# Patient Record
Sex: FEMALE | Race: WHITE | NOT HISPANIC OR LATINO
[De-identification: names, ages, dates, MRNs, and addresses within clinical notes are randomized per-mention and may not be internally consistent; named-entity substitution may affect disease eponyms.]

---

## 2017-07-17 ENCOUNTER — CHARTING TRANS (OUTPATIENT)
Dept: OTHER | Age: 72
End: 2017-07-17

## 2017-07-17 ASSESSMENT — PAIN SCALES - GENERAL: PAINLEVEL_OUTOF10: 0

## 2017-11-09 ENCOUNTER — IMAGING SERVICES (OUTPATIENT)
Dept: OTHER | Age: 72
End: 2017-11-09

## 2017-11-09 ENCOUNTER — HOSPITAL (OUTPATIENT)
Dept: OTHER | Age: 72
End: 2017-11-09
Attending: OBSTETRICS & GYNECOLOGY

## 2017-11-17 ENCOUNTER — CHARTING TRANS (OUTPATIENT)
Dept: OTHER | Age: 72
End: 2017-11-17

## 2018-11-03 VITALS
WEIGHT: 114 LBS | TEMPERATURE: 98.6 F | HEART RATE: 106 BPM | RESPIRATION RATE: 18 BRPM | DIASTOLIC BLOOD PRESSURE: 80 MMHG | SYSTOLIC BLOOD PRESSURE: 138 MMHG | BODY MASS INDEX: 21.52 KG/M2 | HEIGHT: 61 IN

## 2021-03-14 ENCOUNTER — APPOINTMENT (OUTPATIENT)
Dept: GENERAL RADIOLOGY | Age: 76
DRG: 562 | End: 2021-03-14
Attending: PHYSICIAN ASSISTANT
Payer: MEDICARE

## 2021-03-14 ENCOUNTER — HOSPITAL ENCOUNTER (INPATIENT)
Facility: HOSPITAL | Age: 76
LOS: 3 days | Discharge: HOSPICE | DRG: 562 | End: 2021-03-19
Attending: HOSPITALIST | Admitting: HOSPITALIST
Payer: MEDICARE

## 2021-03-14 ENCOUNTER — APPOINTMENT (OUTPATIENT)
Dept: CT IMAGING | Age: 76
DRG: 562 | End: 2021-03-14
Attending: PHYSICIAN ASSISTANT
Payer: MEDICARE

## 2021-03-14 DIAGNOSIS — J44.1 COPD EXACERBATION (HCC): ICD-10-CM

## 2021-03-14 DIAGNOSIS — S52.509A DISPLACED FRACTURE OF DISTAL END OF RADIUS: ICD-10-CM

## 2021-03-14 DIAGNOSIS — S52.614A CLOSED NONDISPLACED FRACTURE OF STYLOID PROCESS OF RIGHT ULNA, INITIAL ENCOUNTER: Primary | ICD-10-CM

## 2021-03-14 DIAGNOSIS — R42 VERTIGO: ICD-10-CM

## 2021-03-14 PROBLEM — R73.9 HYPERGLYCEMIA: Status: ACTIVE | Noted: 2021-03-14

## 2021-03-14 PROBLEM — D64.9 ANEMIA: Status: ACTIVE | Noted: 2021-03-14

## 2021-03-14 PROBLEM — E87.3 METABOLIC ALKALOSIS: Status: ACTIVE | Noted: 2021-03-14

## 2021-03-14 LAB
ALBUMIN SERPL-MCNC: 3.1 G/DL (ref 3.4–5)
ALBUMIN/GLOB SERPL: 0.8 {RATIO} (ref 1–2)
ALP LIVER SERPL-CCNC: 52 U/L
ALT SERPL-CCNC: 26 U/L
ANION GAP SERPL CALC-SCNC: 1 MMOL/L (ref 0–18)
AST SERPL-CCNC: 37 U/L (ref 15–37)
BASOPHILS # BLD AUTO: 0.03 X10(3) UL (ref 0–0.2)
BASOPHILS NFR BLD AUTO: 0.4 %
BILIRUB SERPL-MCNC: 0.3 MG/DL (ref 0.1–2)
BILIRUB UR QL STRIP.AUTO: NEGATIVE
BUN BLD-MCNC: 13 MG/DL (ref 7–18)
BUN/CREAT SERPL: 19.1 (ref 10–20)
CALCIUM BLD-MCNC: 9.8 MG/DL (ref 8.5–10.1)
CHLORIDE SERPL-SCNC: 101 MMOL/L (ref 98–112)
CLARITY UR REFRACT.AUTO: CLEAR
CO2 SERPL-SCNC: 39 MMOL/L (ref 21–32)
COLOR UR AUTO: YELLOW
CREAT BLD-MCNC: 0.68 MG/DL
DEPRECATED RDW RBC AUTO: 46.5 FL (ref 35.1–46.3)
EOSINOPHIL # BLD AUTO: 0.07 X10(3) UL (ref 0–0.7)
EOSINOPHIL NFR BLD AUTO: 0.9 %
ERYTHROCYTE [DISTWIDTH] IN BLOOD BY AUTOMATED COUNT: 13.1 % (ref 11–15)
GLOBULIN PLAS-MCNC: 3.9 G/DL (ref 2.8–4.4)
GLUCOSE BLD-MCNC: 116 MG/DL (ref 70–99)
GLUCOSE UR STRIP.AUTO-MCNC: NEGATIVE MG/DL
HCT VFR BLD AUTO: 35.8 %
HGB BLD-MCNC: 10.3 G/DL
IMM GRANULOCYTES # BLD AUTO: 0.01 X10(3) UL (ref 0–1)
IMM GRANULOCYTES NFR BLD: 0.1 %
ISTAT BLOOD GAS BASE EXCESS: -2 MMOL/L (ref ?–30)
ISTAT BLOOD GAS HCO3: 23.2 MEQ/L (ref 22–26)
ISTAT BLOOD GAS O2 SATURATION: 69 % (ref 60–85)
ISTAT BLOOD GAS PCO2: 39.4 MMHG (ref 38–50)
ISTAT BLOOD GAS PH: 7.38 (ref 7.32–7.43)
ISTAT BLOOD GAS PO2: <70 MMHG (ref 35–40)
ISTAT BLOOD GAS TCO2: 24 MMOL/L (ref 22–32)
KETONES UR STRIP.AUTO-MCNC: NEGATIVE MG/DL
LEUKOCYTE ESTERASE UR QL STRIP.AUTO: NEGATIVE
LYMPHOCYTES # BLD AUTO: 1.66 X10(3) UL (ref 1–4)
LYMPHOCYTES NFR BLD AUTO: 21 %
M PROTEIN MFR SERPL ELPH: 7 G/DL (ref 6.4–8.2)
MCH RBC QN AUTO: 27.6 PG (ref 26–34)
MCHC RBC AUTO-ENTMCNC: 28.8 G/DL (ref 31–37)
MCV RBC AUTO: 96 FL
MONOCYTES # BLD AUTO: 0.73 X10(3) UL (ref 0.1–1)
MONOCYTES NFR BLD AUTO: 9.2 %
NEUTROPHILS # BLD AUTO: 5.42 X10 (3) UL (ref 1.5–7.7)
NEUTROPHILS # BLD AUTO: 5.42 X10(3) UL (ref 1.5–7.7)
NEUTROPHILS NFR BLD AUTO: 68.4 %
NITRITE UR QL STRIP.AUTO: NEGATIVE
NT-PROBNP SERPL-MCNC: 76 PG/ML (ref ?–125)
OSMOLALITY SERPL CALC.SUM OF ELEC: 293 MOSM/KG (ref 275–295)
PH UR STRIP.AUTO: 5.5 [PH] (ref 5–8)
PLATELET # BLD AUTO: 202 10(3)UL (ref 150–450)
POTASSIUM SERPL-SCNC: 5.1 MMOL/L (ref 3.5–5.1)
PROT UR STRIP.AUTO-MCNC: NEGATIVE MG/DL
RBC # BLD AUTO: 3.73 X10(6)UL
SARS-COV-2 RNA RESP QL NAA+PROBE: NOT DETECTED
SODIUM SERPL-SCNC: 141 MMOL/L (ref 136–145)
SP GR UR STRIP.AUTO: >=1.03 (ref 1–1.03)
TROPONIN I SERPL-MCNC: <0.045 NG/ML (ref ?–0.04)
UROBILINOGEN UR STRIP.AUTO-MCNC: 0.2 MG/DL
WBC # BLD AUTO: 7.9 X10(3) UL (ref 4–11)

## 2021-03-14 PROCEDURE — 71101 X-RAY EXAM UNILAT RIBS/CHEST: CPT | Performed by: PHYSICIAN ASSISTANT

## 2021-03-14 PROCEDURE — 70450 CT HEAD/BRAIN W/O DYE: CPT | Performed by: PHYSICIAN ASSISTANT

## 2021-03-14 PROCEDURE — 73110 X-RAY EXAM OF WRIST: CPT | Performed by: PHYSICIAN ASSISTANT

## 2021-03-14 PROCEDURE — 72072 X-RAY EXAM THORAC SPINE 3VWS: CPT | Performed by: PHYSICIAN ASSISTANT

## 2021-03-14 PROCEDURE — 99219 INITIAL OBSERVATION CARE,LEVL II: CPT | Performed by: HOSPITALIST

## 2021-03-14 PROCEDURE — 72110 X-RAY EXAM L-2 SPINE 4/>VWS: CPT | Performed by: PHYSICIAN ASSISTANT

## 2021-03-14 RX ORDER — CLONAZEPAM 0.5 MG/1
0.25 TABLET ORAL 2 TIMES DAILY PRN
Status: DISCONTINUED | OUTPATIENT
Start: 2021-03-14 | End: 2021-03-16

## 2021-03-14 RX ORDER — HYDROMORPHONE HYDROCHLORIDE 1 MG/ML
0.5 INJECTION, SOLUTION INTRAMUSCULAR; INTRAVENOUS; SUBCUTANEOUS EVERY 30 MIN PRN
Status: ACTIVE | OUTPATIENT
Start: 2021-03-14 | End: 2021-03-15

## 2021-03-14 RX ORDER — BISACODYL 10 MG
10 SUPPOSITORY, RECTAL RECTAL DAILY
Status: ON HOLD | COMMUNITY
End: 2021-03-18

## 2021-03-14 RX ORDER — ALBUTEROL SULFATE 90 UG/1
2 AEROSOL, METERED RESPIRATORY (INHALATION) EVERY 4 HOURS PRN
COMMUNITY

## 2021-03-14 RX ORDER — OXYCODONE HYDROCHLORIDE 5 MG/1
0.5 TABLET ORAL EVERY 6 HOURS PRN
Status: DISCONTINUED | OUTPATIENT
Start: 2021-03-14 | End: 2021-03-15

## 2021-03-14 RX ORDER — MECLIZINE HYDROCHLORIDE 25 MG/1
25 TABLET ORAL ONCE
Status: COMPLETED | OUTPATIENT
Start: 2021-03-14 | End: 2021-03-15

## 2021-03-14 RX ORDER — LORAZEPAM 0.5 MG/1
0.5 TABLET ORAL AS DIRECTED
Status: ON HOLD | COMMUNITY
End: 2021-03-18

## 2021-03-14 RX ORDER — OXYCODONE HYDROCHLORIDE 5 MG/1
5 TABLET ORAL EVERY 4 HOURS PRN
Status: ON HOLD | COMMUNITY
End: 2021-03-18

## 2021-03-14 RX ORDER — PAROXETINE 30 MG/1
30 TABLET, FILM COATED ORAL EVERY MORNING
COMMUNITY

## 2021-03-14 RX ORDER — ALBUTEROL SULFATE 90 UG/1
2 AEROSOL, METERED RESPIRATORY (INHALATION) EVERY 4 HOURS PRN
Status: DISCONTINUED | OUTPATIENT
Start: 2021-03-14 | End: 2021-03-19

## 2021-03-14 RX ORDER — LORAZEPAM 0.5 MG/1
0.5 TABLET ORAL NIGHTLY
Status: DISCONTINUED | OUTPATIENT
Start: 2021-03-14 | End: 2021-03-16

## 2021-03-14 RX ORDER — SENNA AND DOCUSATE SODIUM 50; 8.6 MG/1; MG/1
1 TABLET, FILM COATED ORAL DAILY
COMMUNITY

## 2021-03-14 RX ORDER — ONDANSETRON 2 MG/ML
4 INJECTION INTRAMUSCULAR; INTRAVENOUS EVERY 4 HOURS PRN
Status: ACTIVE | OUTPATIENT
Start: 2021-03-14 | End: 2021-03-15

## 2021-03-14 NOTE — ED PROVIDER NOTES
Patient Seen in: [facility] In [place]      History   Patient presents with:  Fall    Stated Complaint:     HPI/Subjective:   HPI    68-year-old female. Medical history of pulmonary hypertension with COPD, asthma, hypertension, IBS. °C) (Temporal)   Resp 22   Wt 45.8 kg   SpO2 100%         Physical Exam      Gen: Well appearing, well groomed, alert and aware x 3  Neck: Supple, full range of motion, no thyromegaly or lymphadenopathy. No cervical point tenderness.   Eye examination: EOM following orders were created for panel order CBC WITH DIFFERENTIAL WITH PLATELET.   Procedure                               Abnormality         Status                     ---------                               -----------         ------ There is a right mastoid effusion.    Dictated by (CST): Adele Padilla MD on 3/14/2021 at 5:31 PM     Finalized by (CST): Adele Padilla MD on 3/14/2021 at 5:33 PM              MDM          My supervising physician was involved in the management of this pat (primary encounter diagnosis)  Displaced fracture of distal end of radius  Vertigo  COPD exacerbation ([facility] [address]    Disposition:  There is no disposition on file for this visit. There is no disposition time on file for this visit.     Follow-up:  No follow-up p

## 2021-03-14 NOTE — ED INITIAL ASSESSMENT (HPI)
Pt to ed by ems with c/o room spinning after getting up and walking, fell to the ground, pain and injury to r wrist, pt admits to having the symptoms for about 2 days, pt also having some pain to lower back and rib area.  aaox3,

## 2021-03-15 PROBLEM — J44.9 CHRONIC OBSTRUCTIVE PULMONARY DISEASE (HCC): Status: ACTIVE | Noted: 2021-03-14

## 2021-03-15 LAB
ANION GAP SERPL CALC-SCNC: 2 MMOL/L (ref 0–18)
ATRIAL RATE: 90 BPM
BUN BLD-MCNC: 10 MG/DL (ref 7–18)
BUN/CREAT SERPL: 45.5 (ref 10–20)
CALCIUM BLD-MCNC: 10 MG/DL (ref 8.5–10.1)
CHLORIDE SERPL-SCNC: 104 MMOL/L (ref 98–112)
CO2 SERPL-SCNC: 36 MMOL/L (ref 21–32)
CREAT BLD-MCNC: 0.22 MG/DL
GLUCOSE BLD-MCNC: 104 MG/DL (ref 70–99)
OSMOLALITY SERPL CALC.SUM OF ELEC: 293 MOSM/KG (ref 275–295)
P AXIS: 96 DEGREES
P-R INTERVAL: 120 MS
POTASSIUM SERPL-SCNC: 4 MMOL/L (ref 3.5–5.1)
Q-T INTERVAL: 368 MS
QRS DURATION: 88 MS
QTC CALCULATION (BEZET): 450 MS
R AXIS: 86 DEGREES
SODIUM SERPL-SCNC: 142 MMOL/L (ref 136–145)
T AXIS: 77 DEGREES
VENTRICULAR RATE: 90 BPM

## 2021-03-15 PROCEDURE — 99225 SUBSEQUENT OBSERVATION CARE: CPT | Performed by: INTERNAL MEDICINE

## 2021-03-15 RX ORDER — OXYCODONE HYDROCHLORIDE 5 MG/1
5 TABLET ORAL EVERY 6 HOURS PRN
Status: DISCONTINUED | OUTPATIENT
Start: 2021-03-15 | End: 2021-03-15

## 2021-03-15 RX ORDER — SENNA AND DOCUSATE SODIUM 50; 8.6 MG/1; MG/1
1 TABLET, FILM COATED ORAL DAILY
Status: DISCONTINUED | OUTPATIENT
Start: 2021-03-15 | End: 2021-03-19

## 2021-03-15 RX ORDER — OXYCODONE HYDROCHLORIDE 5 MG/1
5 TABLET ORAL EVERY 4 HOURS PRN
Status: DISCONTINUED | OUTPATIENT
Start: 2021-03-15 | End: 2021-03-19

## 2021-03-15 RX ORDER — ACETAMINOPHEN 325 MG/1
650 TABLET ORAL EVERY 6 HOURS PRN
Status: DISCONTINUED | OUTPATIENT
Start: 2021-03-15 | End: 2021-03-19

## 2021-03-15 NOTE — DIETARY NOTE
[place]     Admitting diagnosis:  Vertigo [R42]  COPD exacerbation ([facility] [address] [J44.1]  Closed nondisplaced fracture of styloid process of right ulna, initial encounter [B59.239L]  Displaced fracture of distal end o

## 2021-03-15 NOTE — PHYSICAL THERAPY NOTE
PHYSICAL THERAPY EVALUATION - INPATIENT     Room Number: 361/361-A  Evaluation Date: 3/15/2021  Type of Evaluation: Initial  Physician Order: PT Eval and Treat    Presenting Problem: closed nondisplaced fracture of styloid process of right ulna  Reas week. Pt was receiving assistance with bathing and donning pants. Pt does not use an assistive device for ambulation. SUBJECTIVE  \"I can't right now, I need my medication, I feel SOB and anxious. \"    Patient self-stated goal is to rest.    OBJECTIVE STATUS  Gait Assessment   Gait Assistance: Not tested           Stoop/Curb Assistance: Not tested       Skilled Therapy Provided: PT orders received, chart reviewed, and RN approved PT session.  PT with proper PPE donned to include mask, gloves, goggles, an staff;Needs met;Call light within reach;RN aware of session/findings;Bracing education provided; All patient questions and concerns addressed;SCDs in place; Alarm set; Family present; Discussed recommendations with /    ASSESSMENT   Pa motion;Strengthening;Stair training;Transfer training; Other (Comment)  Rehab Potential : Good  Frequency (Obs): 5x/week  Number of Visits to Meet Established Goals: 2      CURRENT GOALS    Goal #1 Patient is able to demonstrate supine - sit EOB @ level: mo

## 2021-03-15 NOTE — PLAN OF CARE
Pt Aox4, anxious at times. Pt sat up to side of bed upon arrival, c/o of dizziness and wanted to lie back down. Patient lying on right side of body, refusing to be repositioned with pillows under affected arm - RUE at this time.  3L of O2 via NC, patient's

## 2021-03-15 NOTE — ED NOTES
Pt placed in short arm splint sugar tongue, pt tolerated well, pt refused dose of meclizine at this time, pt informed of admit and transfer to [facility], pt daughter at bed side.

## 2021-03-15 NOTE — CONSULTS
Distal radius fracture with mild angulation in splint  Can be treated nonsurgically  Continue present splint  F/u with hand doctor in one week to take new xrays and likely cast placement at that time depending on xray.   Non weight bearing on the wrist

## 2021-03-15 NOTE — CM/SW NOTE
Spoke with pt's son who stated he has spoken with pt and his sister and they are planning to have patient return to [address] [place] [address] with resumption of hospice care and an increase in services at [address] ([phone]) to confirm

## 2021-03-15 NOTE — CM/SW NOTE
03/15/21 1100   CM/SW Referral Data   Referral Source Social Work (self-referral)   Reason for Referral Discharge planning;Psychoscial assessment   Informant Patient; Children   Patient Info   Patient's Mental Status Alert;Oriented   Patient's Home Envir for support and/or discharge planning.      Remberto Gracia, [facility]  Discharge Planner  [phone]

## 2021-03-15 NOTE — PROGRESS NOTES
[facility]  Progress Note    Alyssa Lovell Patient Status:  Observation    1945 MRN [id number]   [facility] 3SW-A Attending Cheryl Lux MD   Hosp Day # 0 PCP No primary care provider on file.      CC: Dizziness/vertigo with eduardo rhythm  Abdomen: soft, non-tender; bowel sounds normal  Extremities: extremities normal,no cyanosis or edema  Pulses: 2+ and symmetric  Skin: Right forearm in splint  Neurologic: Awake,alert,nonfocal  Psych: Mood and affect appears normal      Labs:   Lab mg, 30 mg, Oral, QAM        ASSESSMENT / PLAN:     1. Right wrist impacted and angulated fracture of the distal radial meta physis and nondisplaced fracture of the ulnar styloid status post fall at assisted living facility  1.  right forearm in splint  2. of discharge: To be decided  Discharge is dependent on: Clinical progress, discharge planning  At this point Ms. Phuong Salinas  is expected to be discharge to:  To be decided      Questions/concerns and Plan of care were discussed with patient and family patient'

## 2021-03-15 NOTE — H&P
[facility] HOSPITALIST  History and Physical     Michelle Darling Patient Status:  Emergency    1945 MRN [id number]   Location [facility] Attending Hope Aguilar MD   Hosp Day # 0 PCP No primary care provider on file. 90   Temp 98 °F (36.7 °C) (Temporal)   Resp 22   Wt 101 lb (45.8 kg)   SpO2 100%   General: No acute distress. Alert and oriented x 3. HEENT: Normocephalic atraumatic. Moist mucous membranes. EOM-I. PERRLA. Anicteric. Neck: No lymphadenopathy. No JVD.  No Prerna Amador MD  3/14/2021

## 2021-03-15 NOTE — OCCUPATIONAL THERAPY NOTE
OCCUPATIONAL THERAPY EVALUATION - INPATIENT     Room Number: 361/361-A  Evaluation Date: 3/15/2021  Type of Evaluation: Initial  Presenting Problem: s/p closed nondisplaced fracture R ulna 3/14/21, non-surgical    Physician Order: IP Consult to [facility] assistance up to knees. Has only been at [facility] for 6 days; prior to this, lived in a [address] in [facility] with a caregiver for 6 hours a day; patient reports caregiver did all cooking and cleaning.     SUBJECTIVE   \"I have to lay down, I can't do anything until taking off regular upper body clothing?: A Little  -   Taking care of personal grooming such as brushing teeth?: None  -   Eating meals?: None    AM-PAC Score:  Score: 19  Approx Degree of Impairment: 42.8%  Standardized Score (AM-PAC Scale): 40.22  CMS [place] Complete medical history and occupational profile noted above. Functional outcome measures completed include AM-PAC \"6 clicks\".  In this OT evaluation patient presents with the following performance deficits: increased pain, decreased endurance, increased supervision  Patient will perform lower body dressing:  with supervision and with adaptive equipment PRN  Patient will perform toileting: with supervision    Functional Transfer Goals  Patient will transfer from sit to stand:  with supervision  Patient zenaida

## 2021-03-16 ENCOUNTER — APPOINTMENT (OUTPATIENT)
Dept: CT IMAGING | Facility: HOSPITAL | Age: 76
DRG: 562 | End: 2021-03-16
Attending: INTERNAL MEDICINE
Payer: MEDICARE

## 2021-03-16 LAB
ALLENS TEST: POSITIVE
AMMONIA PLAS-MCNC: 39 UMOL/L (ref 11–32)
ANION GAP SERPL CALC-SCNC: 1 MMOL/L (ref 0–18)
ARTERIAL BLD GAS O2 SATURATION: 95 % (ref 92–100)
ARTERIAL BLD GAS O2 SATURATION: 96 % (ref 92–100)
ARTERIAL BLOOD GAS BASE EXCESS: 14.4 MMOL/L (ref ?–2)
ARTERIAL BLOOD GAS BASE EXCESS: 14.5 MMOL/L (ref ?–2)
ARTERIAL BLOOD GAS HCO3: 44.2 MEQ/L (ref 22–26)
ARTERIAL BLOOD GAS HCO3: 45.6 MEQ/L (ref 22–26)
ARTERIAL BLOOD GAS PCO2: 106 MM HG (ref 35–45)
ARTERIAL BLOOD GAS PCO2: 90 MM HG (ref 35–45)
ARTERIAL BLOOD GAS PH: 7.25 (ref 7.35–7.45)
ARTERIAL BLOOD GAS PH: 7.31 (ref 7.35–7.45)
ARTERIAL BLOOD GAS PO2: 137 MM HG (ref 80–105)
ARTERIAL BLOOD GAS PO2: 89 MM HG (ref 80–105)
BASOPHILS # BLD AUTO: 0.03 X10(3) UL (ref 0–0.2)
BASOPHILS NFR BLD AUTO: 0.4 %
BUN BLD-MCNC: 12 MG/DL (ref 7–18)
BUN/CREAT SERPL: 37.5 (ref 10–20)
CALCIUM BLD-MCNC: 9.8 MG/DL (ref 8.5–10.1)
CALCULATED O2 SATURATION: 96 % (ref 92–100)
CALCULATED O2 SATURATION: 99 % (ref 92–100)
CARBOXYHEMOGLOBIN: 1.3 % SAT (ref 0–3)
CARBOXYHEMOGLOBIN: 1.4 % SAT (ref 0–3)
CHLORIDE SERPL-SCNC: 98 MMOL/L (ref 98–112)
CO2 SERPL-SCNC: 42 MMOL/L (ref 21–32)
CREAT BLD-MCNC: 0.32 MG/DL
D-DIMER: 0.52 UG/ML FEU (ref ?–0.73)
DEPRECATED RDW RBC AUTO: 45.4 FL (ref 35.1–46.3)
EOSINOPHIL # BLD AUTO: 0.01 X10(3) UL (ref 0–0.7)
EOSINOPHIL NFR BLD AUTO: 0.1 %
ERYTHROCYTE [DISTWIDTH] IN BLOOD BY AUTOMATED COUNT: 12.7 % (ref 11–15)
FIO2: 40 %
GLUCOSE BLD-MCNC: 122 MG/DL (ref 70–99)
HCT VFR BLD AUTO: 38 %
HGB BLD-MCNC: 11 G/DL
IMM GRANULOCYTES # BLD AUTO: 0.02 X10(3) UL (ref 0–1)
IMM GRANULOCYTES NFR BLD: 0.3 %
L/M: 3 L/MIN
LYMPHOCYTES # BLD AUTO: 1.11 X10(3) UL (ref 1–4)
LYMPHOCYTES NFR BLD AUTO: 15.6 %
MCH RBC QN AUTO: 28.4 PG (ref 26–34)
MCHC RBC AUTO-ENTMCNC: 28.9 G/DL (ref 31–37)
MCV RBC AUTO: 98.2 FL
METHEMOGLOBIN: 0.6 % SAT (ref 0.4–1.5)
METHEMOGLOBIN: 0.6 % SAT (ref 0.4–1.5)
MONOCYTES # BLD AUTO: 0.83 X10(3) UL (ref 0.1–1)
MONOCYTES NFR BLD AUTO: 11.7 %
NEUTROPHILS # BLD AUTO: 5.1 X10 (3) UL (ref 1.5–7.7)
NEUTROPHILS # BLD AUTO: 5.1 X10(3) UL (ref 1.5–7.7)
NEUTROPHILS NFR BLD AUTO: 71.9 %
OSMOLALITY SERPL CALC.SUM OF ELEC: 293 MOSM/KG (ref 275–295)
P/F RATIO: 433 MMHG
PATIENT TEMPERATURE: 97.6 F
PATIENT TEMPERATURE: 98 F
PLATELET # BLD AUTO: 255 10(3)UL (ref 150–450)
POTASSIUM SERPL-SCNC: 4.2 MMOL/L (ref 3.5–5.1)
RBC # BLD AUTO: 3.87 X10(6)UL
SODIUM SERPL-SCNC: 141 MMOL/L (ref 136–145)
TOTAL HEMOGLOBIN: 10.9 G/DL
TOTAL HEMOGLOBIN: 11.4 G/DL
WBC # BLD AUTO: 7.1 X10(3) UL (ref 4–11)

## 2021-03-16 PROCEDURE — 5A09457 ASSISTANCE WITH RESPIRATORY VENTILATION, 24-96 CONSECUTIVE HOURS, CONTINUOUS POSITIVE AIRWAY PRESSURE: ICD-10-PCS | Performed by: HOSPITALIST

## 2021-03-16 PROCEDURE — 99226 SUBSEQUENT OBSERVATION CARE: CPT | Performed by: INTERNAL MEDICINE

## 2021-03-16 PROCEDURE — 70450 CT HEAD/BRAIN W/O DYE: CPT | Performed by: INTERNAL MEDICINE

## 2021-03-16 RX ORDER — CLONAZEPAM 0.5 MG/1
0.25 TABLET ORAL 2 TIMES DAILY
Status: DISCONTINUED | OUTPATIENT
Start: 2021-03-16 | End: 2021-03-19

## 2021-03-16 RX ORDER — ENOXAPARIN SODIUM 100 MG/ML
40 INJECTION SUBCUTANEOUS DAILY
Status: DISCONTINUED | OUTPATIENT
Start: 2021-03-16 | End: 2021-03-19

## 2021-03-16 RX ORDER — LORAZEPAM 0.5 MG/1
0.5 TABLET ORAL NIGHTLY PRN
Status: DISCONTINUED | OUTPATIENT
Start: 2021-03-16 | End: 2021-03-17

## 2021-03-16 NOTE — PLAN OF CARE
Patient received alert but sleepy this morning, she is able to follow commands. Got up to bedside commode with x 2 max assistance. Patient on 4 L oxygen with nasal cannula, labored breathing noted with exertion. Will continue to monitor.

## 2021-03-16 NOTE — CM/SW NOTE
Spoke with Pati Valerio from [facility] ([phone]) who confirmed pt is current with them for hospice services.   They have not revoked hospice as pt is in observation status at the hospital.  If pt is changed to inpatient status, hospice would need to be

## 2021-03-16 NOTE — PROGRESS NOTES
Results for Zoraida Carmichael (MRN [id number]) as of 3/16/2021 15:20    3/16/2021    ABG PCO2  106 (HH)     Patient placed on BiPap. Pulmonary Dr. Bruna Landis consulted and seen patient.

## 2021-03-16 NOTE — PROGRESS NOTES
[facility]  Progress Note    Michelle Darling Patient Status:  Observation    1945 MRN [id number]   [facility] 3SW-A Attending Lisa Mark MD   Hosp Day # 0 PCP No primary care provider on file.      CC: Dizziness/vertigo with eduardo moist  Neck: no adenopathy, no carotid bruit, no JVD  Lungs: clear to auscultation bilaterally  Heart: S1, S2 normal, no murmur,  regular rate and rhythm  Abdomen: soft, non-tender; bowel sounds normal  Extremities: extremities normal,no cyanosis or edema 5 mg, Oral, Q4H PRN  Senna-Docusate Sodium (SENOKOT S) 8.6-50 MG tab 1 tablet, 1 tablet, Oral, Daily  fluticasone-Umeclidin-Vilant (TRELEGY ELLIPTA) 100-62.5-25 MCG/INH inhaler 1 puff, 1 puff, Inhalation, Daily  Albuterol Sulfate  (90 Base) MCG/ACT home  5. Dizziness, vertigo and status post fall at assisted living facility  1. Status post meclizine 1 dose which was ordered from ER  2. Patient states she feels better today. 3. Will use meclizine as needed vertigo  4. Fall precautions  5.  Seen by PT

## 2021-03-16 NOTE — PHYSICAL THERAPY NOTE
Attempted PT treatment. Pt sleepy and difficult to arouse, opens eyes but does not keep open. Not following commands. Respirations 29, appears to be labored with breathing. /31. Dtr present.  Discussed PT goals with dtr to sit pt at side of bed to enco

## 2021-03-16 NOTE — CONSULTS
[facility] PULMONARY/CRITICAL CARE CONSULTATION       HPI: Chang Flower is a 70year old female with a history of COPD, chronic hypoxemic respiratory failure who presented to the ER 3/14 after a fall resulting in a wrist fracture.  She was admitted to the hospit Patient not taking: Reported on 3/14/2021    magnesium hydroxide 400 MG/5ML Oral Suspension   Yes Yes   Sig: Take by mouth daily as needed for constipation.      oxyCODONE HCl 5 MG Oral Tab 3/14/2021 at Unknown time  Yes Yes   Sig: Take 5 mg by mouth every Social Determinants of Health  Financial Resource Strain:       Difficulty of Paying Living Expenses:   Food Insecurity:       Worried About Running Out of Food in the Last Year:       Ran Out of Food in the Last Year:   Transportation Needs:       Lack --   --    ALKPHO 52*  --   --    AST 37  --   --    ALT 26  --   --    BILT 0.3  --   --    TP 7.0  --   --         Recent Labs   Lab 03/14/21  1630   TROP <0.045     Recent Labs   Lab 03/16/21  1351   ABGPHT 7.25*   RBTXZM2O 106*   SBUSU6U 137*   ABGHCO3

## 2021-03-16 NOTE — PROGRESS NOTES
PCO2 90, Dr. Olivarez Ohs notified, no changed in BiPAP setting. Patient alert, awake and conversant. Placed on oxygen at 3L/nasal cannula while eating, oxygen saturation 97%.

## 2021-03-16 NOTE — PROGRESS NOTES
Daughter at the bedside verbalized concerned that patient is sleeping more today, having hard time keeping her eyes open with conversation and looks very weak than yesterday. Dr. Susan Lewis notified of patient's change in condition.   MD came and examined marina

## 2021-03-16 NOTE — CM/SW NOTE
Received call from Zach Dickson with [facility] stating pt's son, Aman Mosley ([phone]) had contacted them regarding possible admission there. Spoke with pt's son, Aman Mosley regarding DC planning.   Pt's son stated they are concerned that [address] [place] [address] may not b

## 2021-03-16 NOTE — PROGRESS NOTES
03/16/21 1643   Clinical Encounter Type   Visited With Health care provider   Routine Visit Introduction  ( filled out new POLST documents for patient per RN request. Document left on patient chart for signature, witness and physician signature)

## 2021-03-16 NOTE — PLAN OF CARE
Alert and oriented x 4, anxious at times. VSS. On 3L O2 via nasal cannula as baseline - uses home O2. Telemetry monitoring. SCDs on BLE. Last BM 3/14. Splint/cast to RUE C/D/I. NWB to [facility]. Isolation precautions maintained. Safety precautions in place.  Call

## 2021-03-17 LAB
ARTERIAL BLD GAS O2 SATURATION: 95 % (ref 92–100)
ARTERIAL BLOOD GAS BASE EXCESS: 12 MMOL/L (ref ?–2)
ARTERIAL BLOOD GAS HCO3: 42.6 MEQ/L (ref 22–26)
ARTERIAL BLOOD GAS PCO2: 98 MM HG (ref 35–45)
ARTERIAL BLOOD GAS PH: 7.25 (ref 7.35–7.45)
ARTERIAL BLOOD GAS PO2: 94 MM HG (ref 80–105)
CALCULATED O2 SATURATION: 96 % (ref 92–100)
CARBOXYHEMOGLOBIN: 1.4 % SAT (ref 0–3)
L/M: 3 L/MIN
METHEMOGLOBIN: 0.6 % SAT (ref 0.4–1.5)
P/F RATIO: 299 MMHG
PATIENT TEMPERATURE: 98.1 F
TOTAL HEMOGLOBIN: 11.1 G/DL

## 2021-03-17 PROCEDURE — 99232 SBSQ HOSP IP/OBS MODERATE 35: CPT | Performed by: HOSPITALIST

## 2021-03-17 RX ORDER — LORAZEPAM 0.5 MG/1
0.5 TABLET ORAL EVERY 6 HOURS PRN
Status: DISCONTINUED | OUTPATIENT
Start: 2021-03-17 | End: 2021-03-19

## 2021-03-17 NOTE — PHYSICAL THERAPY NOTE
Attempted to see Pt this AM - RN Gen aware of attempt. Writer/OT Gricel spoke to Pt's daughter at bedside. Pt currently resting on BiPap. Pt's family wishes for therapy to sign off at this time. RN aware to re-order if [address] changes this admission.

## 2021-03-17 NOTE — PLAN OF CARE
Patient received awake, alert and oriented x 3, able to follow instructions/direction. Patient on oxygen at 3L/nasal cannula, refusing to use BiPap. Son and daughter spoke with Dr. Pedro Pablo Bowens and Dr. Sreedhar Zuleta regarding plan of care.   Nayeli MCMANUS spoke

## 2021-03-17 NOTE — CM/SW NOTE
Case discussed in rounds. Pt to return to [facility]. , resume [facility] hospice. Called [facility], awaiting call back from their admitting nurse Collin Salinas. Latest notes, MD orders sent via Aidin. GABRIELLE will follow up.       GABRIELLE s/w pt's son Maylin Delcid, he is looking to have

## 2021-03-17 NOTE — PLAN OF CARE
Patient alert and oriented with times of confusion. Vitals stable. Pt tolerated BiPaP intermittently throughout the night, when not on BiPaP 3-4L NC applied.  SOB with exertion, patient has panic attacks resolved with breathing and presence of staff/kenny

## 2021-03-17 NOTE — PROGRESS NOTES
[facility]  Progress Note    [facility] Patient Status:  Observation    1945 MRN [id number]   [facility] 3SW-A Attending Laith Starr MD   Hosp Day # 1 PCP No primary care provider on file.      CC: Dizziness/vertigo with room 9.8 03/16/2021    DDIMER 0.52 03/16/2021       Imaging:      Meds:   LORazepam (ATIVAN) tab 0.5 mg, 0.5 mg, Oral, Nightly PRN  clonazePAM (KLONOPIN) tab 0.25 mg, 0.25 mg, Oral, BID  Enoxaparin Sodium (LOVENOX) 40 MG/0.4ML injection 40 mg, 40 mg, Subcutaneo facility  1. will use meclizine as needed vertigo  2. Fall precautions  3. Seen by PT OT who recommended home with home health PT OT and 24-hour supervision.   6. Altered mental status due to acute hypercapnic respiratory failure and carbon dioxide narcosis

## 2021-03-17 NOTE — OCCUPATIONAL THERAPY NOTE
Patient attempted to be seen for therapy this am with PTA Mariella, family at bedside. Patient on BiPap and dozing; family requests discontinuing IP OT services at this time as patient plans to resume hospice services.  Patient's family and RN aware that if ne

## 2021-03-17 NOTE — PROGRESS NOTES
03/17/21 0409   BiPAP   $ RT Standby Charge (per 15 min) 1   Device V60   BiPAP / CPAP CE# 6062   Mode Spontaneous/Timed   Interface Full face mask   Mask Size Small   Control Settings   Set Rate 20 breaths/min   Set IPAP 14   Set EPAP 5   Oxygen Percen

## 2021-03-17 NOTE — PROGRESS NOTES
[facility] PULMONARY/CRITICAL CARE    S: Pt is feeling ok today.  Her PCO2 level was elevated this am.     Meds:  • ClonazePAM  0.25 mg Oral BID   • enoxaparin  40 mg Subcutaneous Daily   • Senna-Docusate Sodium  1 tablet Oral Daily   • fluticasone-Umeclidin-Vilan sleep  -pt is normally on daytime low flow O2 and nocturnal high flow O2 -- it is possible that this is not effectively controlling her CO2 levels, a switch to NIPPV would be more beneficial    -cautious use of opiates and benzos  2.  Encephalopathy - secon

## 2021-03-17 NOTE — PROGRESS NOTES
Patient removed BiPaP and refused to be put back on. Nasal cannula applied 5L 98% tolerating well.    Respiratory therapist aware    0200: Pulmonary on call paged regarding BiPaP removal. Repeat ABG at 0700, reapply BiPaP if patient willing per Dr. Yajaira Moreno

## 2021-03-17 NOTE — CM/SW NOTE
GABRIELLE s/w Pt's son Rufino Maharaj, he reports that he just completed an application over at [facility] and prefers that pt go there with [facility] hospice services. GABRIELLE called [facility], no answer, it goes to voice mail.  Sending message via [employer] regarding their bed av

## 2021-03-18 RX ORDER — SENNA AND DOCUSATE SODIUM 50; 8.6 MG/1; MG/1
1 TABLET, FILM COATED ORAL DAILY
Qty: 30 TABLET | Refills: 0 | Status: SHIPPED | OUTPATIENT
Start: 2021-03-18

## 2021-03-18 RX ORDER — POLYETHYLENE GLYCOL 3350 17 G/17G
17 POWDER, FOR SOLUTION ORAL DAILY
Refills: 0 | Status: SHIPPED | COMMUNITY
Start: 2021-03-18

## 2021-03-18 RX ORDER — LORAZEPAM 0.5 MG/1
0.5 TABLET ORAL AS DIRECTED
Qty: 30 TABLET | Refills: 0 | Status: SHIPPED | OUTPATIENT
Start: 2021-03-18

## 2021-03-18 RX ORDER — BISACODYL 10 MG
10 SUPPOSITORY, RECTAL RECTAL
Status: DISCONTINUED | OUTPATIENT
Start: 2021-03-18 | End: 2021-03-19

## 2021-03-18 RX ORDER — CLONAZEPAM 0.5 MG/1
0.25 TABLET ORAL 2 TIMES DAILY PRN
Qty: 30 TABLET | Refills: 0 | Status: SHIPPED | OUTPATIENT
Start: 2021-03-18

## 2021-03-18 RX ORDER — OXYCODONE HYDROCHLORIDE 5 MG/1
5 TABLET ORAL EVERY 4 HOURS PRN
Qty: 30 TABLET | Refills: 0 | Status: SHIPPED | OUTPATIENT
Start: 2021-03-18

## 2021-03-18 RX ORDER — NALOXONE HYDROCHLORIDE 4 MG/.1ML
4 SPRAY, METERED NASAL AS NEEDED
Qty: 1 KIT | Refills: 0 | Status: SHIPPED | OUTPATIENT
Start: 2021-03-18

## 2021-03-18 NOTE — PROGRESS NOTES
03/18/21 0333   BiPAP   $ RT Standby Charge (per 15 min) 1   Device V60   BiPAP / CPAP CE# 6062   Mode Spontaneous/Timed   Interface Full face mask   Mask Size Small   Control Settings   Set Rate 20 breaths/min   Set IPAP 14   Set EPAP 5   Oxygen Percen

## 2021-03-18 NOTE — CM/SW NOTE
GABRIELLE follow up. [facility] will have a bed available for pt today after 3pm. GABRIELLE s/w Negrita at [facility] who advised that case has moved to their [facility] office and will need to send new referral via Aidin.  GABRIELLE sent referral, including MD orders

## 2021-03-18 NOTE — PROGRESS NOTES
Pulmonary Progress Note        NAME: Meera Costello - ROOM: [address] [place] [address] - MRN: [id number] - Age: 70year old - : 1945        Last 24hrs: No events overnight, appears somewhat anxious this afternoon    OBJECTIVE:   21  0333 21  0430  chronic hypoxemia and hypercapnea. No obvious signs of acute exacerbation  -O2 and bipap as above  -continue trelegy  -continue prn albuterol  4. Fall - resulting in right wrist fracture  -non operative management, per ortho  5. Proph -[address]  6.  Dispo -[facility].

## 2021-03-18 NOTE — PLAN OF CARE
Pt Aox4, intermittent confusion. Bipap at all times, 4L while eating. C/o pain to right elbow, relieved by PO pain meds. Ace wrap dressing and post mold to STARR ALEX. VS remain stable. Voiding via purewick, did not have any output overnight.  Bladder sca

## 2021-03-18 NOTE — PROGRESS NOTES
[facility]  Progress Note    Maribell Garcia Patient Status:  Observation    1945 MRN [id number]   [facility] 3SW-A Attending Mike Dobbins MD   Hosp Day # 2 PCP No primary care provider on file.      CC: Dizziness/vertigo with room S) 8.6-50 MG tab 1 tablet, 1 tablet, Oral, Daily  fluticasone-Umeclidin-Vilant (TRELEGY ELLIPTA) 100-62.5-25 MCG/INH inhaler 1 puff, 1 puff, Inhalation, Daily  Albuterol Sulfate  (90 Base) MCG/ACT inhaler 2 puff, 2 puff, Inhalation, Q4H PRN  marcosesi shortly and provide settings for dc     Quality:  · DVT Prophylaxis: SCD, subcutaneous Lovenox  · CODE STATUS: DNR/selective treatment according to patient's daughter  · Reis: No  · If Covid testing is negative, may discontinue isolation: no      Will the

## 2021-03-18 NOTE — PLAN OF CARE
Patient alert and oriented x3-4. Needs occasional reorientation, some forgetfulness overnight. Vital signs stable. Tele; -120's. Remained on BiPaP all night. Tolerated well. SCD's on. Voiding via purewick. Post-mold and ace wrap to RUE.  NWB status ma

## 2021-03-18 NOTE — CM/SW NOTE
SW arranged [facility] for 10:30am tomorrow morning. Pt going to [facility] under [facility]. SW will follow up tomorrow.     [place] [id number] [facility] [phone]  [facility] [address] [facility], [address] [place] [address]

## 2021-03-19 VITALS
OXYGEN SATURATION: 96 % | WEIGHT: 101 LBS | RESPIRATION RATE: 22 BRPM | TEMPERATURE: 98 F | SYSTOLIC BLOOD PRESSURE: 92 MMHG | DIASTOLIC BLOOD PRESSURE: 64 MMHG | HEART RATE: 116 BPM

## 2021-03-19 PROCEDURE — 99239 HOSP IP/OBS DSCHRG MGMT >30: CPT | Performed by: HOSPITALIST

## 2021-03-19 NOTE — PROGRESS NOTES
[facility]  Progress Note    Kalie Salinas Patient Status:  Observation    1945 MRN [id number]   [facility] 3SW-A Attending Inocente Homans MD   Hosp Day # 3 PCP No primary care provider on file.      CC: Dizziness/vertigo with room Sodium (SENOKOT S) 8.6-50 MG tab 1 tablet, 1 tablet, Oral, Daily  fluticasone-Umeclidin-Vilant (TRELEGY ELLIPTA) 100-62.5-25 MCG/INH inhaler 1 puff, 1 puff, Inhalation, Daily  Albuterol Sulfate  (90 Base) MCG/ACT inhaler 2 puff, 2 puff, Inhalation, DNR/selective treatment according to patient's daughter  · Reis: No  · If Covid testing is negative, may discontinue isolation: no      Will the patient be referred to [facility] on discharge?:  No   Estimated date of discharge:  thurs   Discharge is dependent o

## 2021-03-19 NOTE — PROGRESS NOTES
Received patient @2300 on bipap on below settings. Pt to wear continuously with small breaks in between. Will continue to monitor.         03/19/21 0554   BiPAP   $ RT Standby Charge (per 15 min) 1   $ [facility] Care / Non-Invasive Subsequent Day Yes   Device V6

## 2021-03-19 NOTE — PLAN OF CARE
Pt a/o x4. BiPAP//IS. SCD/ankle pumps. Rolls side to side. VSS. Tele: Sinus tachycardia. Pain controlled with PO pain medication. Decreased urine output bladder scanned= 270 cc. Pt tolerated BiPAP well last night. Reviewed POC with patient.  All safety measu

## 2021-03-19 NOTE — CM/SW NOTE
Case discussed in rounds. Pt will be going to [facility]. SW confirmed with [facility], all DME arranged and they are ready to accept patient.  [facility] arranged for 10:30am.       [facility] [phone]  [address] [place] [address]

## 2021-03-19 NOTE — PLAN OF CARE
Alert and oriented x4, intermittent confusion. Moderate right arm pain controlled with PO pain medication. BiPAP on at all times, oxygen on while eating. Ace wrap and post mold to right arm, CDI. RUE elevated on pillows. Purewick in place.  SCDs bilaterally

## 2021-03-19 NOTE — PROGRESS NOTES
Pulmonary Progress Note        NAME: Alessandra Gunn - ROOM: [facility]- - MRN: [id number] - Age: 70year old - : 1945        Last 24hrs: No events overnight, daughter at bedside    OBJECTIVE:   21  0000 21  0330 21  0518 21  05 exacerbation  -O2 and bipap as above  -continue trelegy  -continue prn albuterol  4. Fall - resulting in right wrist fracture  -non operative management, per ortho  5. Proph -[address]  6.  Dispo - [facility]/select  -lengthy discussion with daughter and patient this

## 2021-03-19 NOTE — PROGRESS NOTES
Pt cleared by all MD's for discharge. Discharge education completed at bedside with pt and daughter, all questions answered. Report given to [facility] receiving nurse, Donnalee Skiff, all questions answered. PIV removed, belongings packed.  Scripts for clonazepam,

## 2021-03-20 NOTE — DISCHARGE SUMMARY
[facility] HOSPITALIST  DISCHARGE SUMMARY     Kati Layer Patient Status:  Inpatient    1945 MRN [id number]   [facility] 3SW-A Attending No att. providers found   [facility] Day # 3 PCP No primary care provider on file.      Date of Admission: somnolent somnolent and unresponsive found to have elevated CO2 90s to 106. Patient was placed on BiPAP CT of the head was negative for acute change.   Have discussed with daughter son and patient at length they want her to remain on BiPAP will be needing potential casting  • Needs to wear BiPAP almost continuously to prevent hypercapnia and decreased alertness  • To wear O2 when off BiPAP  • Continue antianxiety and pain medications  •     Lab/Test results pending at Discharge:   · None    Consultants: Archana Rushing 0.5 MG Tabs  Commonly known as: ATIVAN  Notes to patient: Last dose taken at      Take 1 tablet (0.5 mg total) by mouth As Directed.    Quantity: 30 tablet  Refills: 0     magnesium hydroxide 400 MG/5ML Susp  Commonly known as: MILK OF MAGNESIA      Take by 4/18/2021 Comment    None          Vital signs:  Temp:  [97.2 °F (36.2 °C)-97.6 °F (36.4 °C)] 97.6 °F (36.4 °C)  Pulse:  [] 116  Resp:  [22-33] 22  BP: ()/(57-66) 92/64  FiO2 (%):  [28 %] 28 %    Physical Exam:    General appearance: Patient aw

## (undated) NOTE — IP AVS SNAPSHOT
[address]            (For Outpatient Use Only) Initial Admit Date: 3/14/2021   Inpt/Obs Admit Date: Inpt: 3/16/21 / Obs: 03/14/21   Discharge Date:    Hospital Acct:  [de-identified]   MRN: [de-identified]   CSN: [id number]   CEID: [id number] Financial Class: Medicare    March 19, 2021

## (undated) NOTE — IP AVS SNAPSHOT
Patient Demographics     Address  [address] [place] [id number] Phone  [phone] [facility])  [phone] [facility]      Emergency Contact(s)     Name Relation Home Work [place], [address] [place] [address] Street      Allergies as of 3/19/2021 patient: Last dose taken at      Take 1 tablet (0.5 mg total) by mouth As Directed. Maicol Davila NP         magnesium hydroxide 400 MG/5ML Susp  Commonly known as: MILK OF MAGNESIA      Take by mouth daily as needed for constipation.           Naloxo ID Medication Name Action Time Action Reason Comments    [id number] Albuterol Sulfate  (90 Base) MCG/ACT inhaler 2 puff 03/18/21 1419 Given      [id number] LORazepam (ATIVAN) tab 0.5 mg 03/18/21 1737 Given      [id number] LORazepam (ATIVAN) tab 0.5 mg Status: Final result Updated: 03/14/21 1957    Specimen: Other from [place]      Rapid SARS-CoV-2 by PCR Not Detected      Pending Labs     Order Current Status    VENOUS BLOOD GAS Collected (03/14/21 1846)         H&P - H&P Note      H&P signed by Moo Valladares Allergies:   Gabapentin              NAUSEA AND VOMITING  Morphine                NAUSEA AND VOMITING  Penicillins             NAUSEA AND VOMITING  Aspirin                 NAUSEA ONLY    Medications:  No current facility-administered medications on file pr -Possible vertigo  -No focal neurological deficit  -S/p fall with right wrist fracture  -Monitor overnight  -Ortho evaluation in the morning    2. COPD  -Stable on home inhalers    3. Hypertension  -Stable home medicine     4.   Mild anemia  -Check iron hospice prior to this admission. [MB.3] She has been on high flow oxygen at night and low flow oxygen during the daytime. [MB.4]     PAST MEDICAL HISTORY[MB.1]     Past Medical History:   Diagnosis Date   • Anxiety state    • COPD (chronic obstructive pulmon MG/0.4ML injection 40 mg, 40 mg, Subcutaneous, Daily  acetaminophen (TYLENOL) tab 650 mg, 650 mg, Oral, Q6H PRN  oxyCODONE HCl (OXY-IR) cap/tab 5 mg, 5 mg, Oral, Q4H PRN  Senna-Docusate Sodium (SENOKOT S) 8.6-50 MG tab 1 tablet, 1 tablet, Oral, Daily  flut Frequency of Communication with Friends and Family:       Frequency of Social Gatherings with Friends and Family:       Attends Congregational Services:       Active Member of Clubs or Organizations:       Attends Club or Organization Meetings:       [address] ASSESSMENT AND PLAN[MB.1]     1. Acute[MB. 3]/chronic[MB. 4] hypercapni[MB.3]c /[MB.4] hypoxemic respiratory failure -  secondary to[MB.3] severe[MB. 4] COPD[MB.3], administration of opiates and benzos contributing[MB.4]  -[MB. 3]continue[MB. 4] BIPAP[MB.3]  -r PTA at 3/17/2021 11:17 AM  Version 1 of 1    Author: Harriet Duran PTA Service: Rehab Author Type: Physical Therapy Assistant    Filed: 3/17/2021 11:17 AM Date of Service: 3/17/2021 11:15 AM Status: Signed    : Harriet Duran PTA (Physical The bedside. Patient on BiPap and dozing; family requests discontinuing IP OT services at this time[AR.1] as patient plans to resume hospice services[AR.2].  Patient's family and RN aware that if needed, OT services can be re-ordered at their request.[AR.1]